# Patient Record
Sex: FEMALE | Race: WHITE
[De-identification: names, ages, dates, MRNs, and addresses within clinical notes are randomized per-mention and may not be internally consistent; named-entity substitution may affect disease eponyms.]

---

## 2019-10-08 ENCOUNTER — HOSPITAL ENCOUNTER (OUTPATIENT)
Dept: HOSPITAL 35 - PAIN | Age: 69
Discharge: HOME | End: 2019-10-08
Attending: ANESTHESIOLOGY
Payer: COMMERCIAL

## 2019-10-08 VITALS — DIASTOLIC BLOOD PRESSURE: 75 MMHG | SYSTOLIC BLOOD PRESSURE: 140 MMHG

## 2019-10-08 VITALS — HEIGHT: 67.01 IN | WEIGHT: 173.6 LBS | BODY MASS INDEX: 27.25 KG/M2

## 2019-10-08 DIAGNOSIS — M54.2: Primary | ICD-10-CM

## 2019-10-08 DIAGNOSIS — M79.18: ICD-10-CM

## 2019-10-08 DIAGNOSIS — Z98.890: ICD-10-CM

## 2019-10-08 DIAGNOSIS — Z79.899: ICD-10-CM

## 2019-10-08 DIAGNOSIS — Z79.891: ICD-10-CM

## 2019-10-08 DIAGNOSIS — G89.29: ICD-10-CM

## 2019-10-08 NOTE — NUR
Pain Clinic Assessment:
 
1. History of Osteoarthritis:
Not Applicable
   History of Rheumatoid Arthritis:
Not Applicable
 
2. Height: 5 ft. 7 in. 170.2 cm.
   Weight: 173.6 lb.  oz. 78.744 kg.
   Patient's BMI: 27.2
 
3. Vital Signs:
   BP: 140/75 Pulse: 79 Resp: 16
   Temp:  02 Sat: 100 ECG Mon:
 
4. Pain Intensity: 7
 
5. Fall Risk:
   Dizziness: N  Needs help standing or walking: N
   Fallen in the last 3 months: N
   Fall risk comments:
 
 
6. Patient on Blood Thinner: None
 
7. History of Hypertension: N
 
8. Opioid Therapy greater than 6 weeks: N
   Opiate Contract Signed:
 
9. Risk Assessment Tool Provided:
 
10. Functional Assessment Tool: 47/70
 
11. Recreational Drug Use: Never Drug Type:
    Tobacco Use: Never Smoker Tobacco Type:
       Amount or Packs/day:  How Many Years:
    Alcohol Use: No  Frequency:  Quant:

## 2019-10-15 NOTE — HPC
[facility]
[address] [place]   [address]                     PAIN MANAGEMENT CONSULTATION  
_______________________________________________________________________________
 
Name:       CESAR LEMUS             Room #:                     REG ENEIDA 
LILIANA.#:      [id number]                       Account #:      [id number]  
Admission:  10/08/19    Attend Phys:    Yunior Ortega DO  
Discharge:              Date of Birth:  [birth year]  
                                                          Report #: [id number]
                                                                    [id number]   
_______________________________________________________________________________
THIS REPORT FOR:   //name//                          
 
CC: Shahbaz Hernandez MD
 
DATE OF SERVICE:  10/08/2019
 
 
CHIEF COMPLAINT:  Right cervical pain.
 
HISTORY OF PRESENT ILLNESS:  As you know, the patient is a very pleasant
68-year-old female with recurrent right neck and right trapezius pain.  She
states pain began spontaneously without inciting injury or trauma.  She is
placing pain around 7/10.  The patient indicates pain is exacerbated with moving
her right shoulder or rotating her neck to the right, improves with chiropractic
manipulation for a transient period of time.  Heat and cold compresses,
over-the-counter medications and stretching exercises.  She denies injury or
trauma to the neck or right trapezius that may have led to symptoms.  She states
she spends an exorbitant amount of time in front of a computer and though she
has made some changes from an ergonomic standpoint she continues to experience
pain exacerbated with dozen activities, specifically.
 
ALLERGIES:  NO KNOWN DRUG ALLERGIES.
 
CURRENT MEDICATIONS:  Levothyroxine 88 mcg per day, hydrocodone 5/325 one tab
every 8 hours p.r.n. for pain.
 
SOCIAL HISTORY:  The patient denies tobacco, alcohol, IV or illicit drug use. 
She is working from home.  She is unaccompanied today.
 
IMAGING:  No new imaging available.
 
PQRS:  The patient has known mild arthritic changes of the cervical spine.  No
rheumatoid arthritis.  She is placing pain intensity is 7/10, not a fall risk,
has not had a fall in the last 3 months, not on any blood thinners, nor she is
treated for hypertension.  She is on chronic opioids, but has a low opioid
addiction potential.  Pain impact score 47/70, moderate to severe interference
of daily activities secondary to pain.
 
PHYSICAL EXAMINATION:
VITAL SIGNS:  Blood pressure 140/75, pulse 79, respiratory rate 16 and
unlabored.  The patient is 100% on room air.  Height 5 feet 7 inches tall,
weight 173.6 pounds, BMI calculated 27.2.
GENERAL:  Well-developed, well-nourished, well-hydrated 68-year-old female
appearing stated age.  She is in no acute distress, awake, alert and oriented x
 
 
 
[facility]
[address] [place] [address]                     PAIN MANAGEMENT CONSULTATION  
_______________________________________________________________________________
 
Name:       CESAR LEMUS             [facility] #[facility]                     [facility]      [id number]                       Account #:      [id number]  
Admission:  10/08/19    Attend Phys:    Yunior Ortega DO  
Discharge:              Date of Birth:  [birth year]  
                                                          Report #: [id number]
                                                                    [id number]   
_______________________________________________________________________________
3, pain is rated today 7/10.
HEENT:  Normocephalic, atraumatic.  Pupils equal, round, reactive to light.
EXTREMITIES:  Show no clubbing, no cyanosis, no edema.
MUSCULOSKELETAL:  The patient has palpatory tenderness over the paraspinal
musculature of the cervical spine on the right, negative left.  Deep palpation
in area causes intensification of pain at 9.  Specific trigger points that
radiated the patient's typical pain.  There are multiple tender points as well. 
Cervical provocation testing including extension, rotation, lateral flexion to
the left cause intensification of symptoms.  Rotation of the right slightly
improves overall pain.  Lateral flexion to the right improves pain considerably.
 Spurling's test negative.
 
ASSESSMENT:
1.  Myofascial pain.
2.  Chronic muscle spasms of the paraspinal musculature of cervical spine.
 
PLAN:  The patient has returned today in followup visit with recurrent
myofascial pain involving the paraspinal musculature of the right cervical
spine.  I was able to elicit 9 specific trigger points that caused the patient's
typical radiating pain pattern.  We discussed the trigger point injections again
today.  She noted excellent benefit with previous trigger points series.  She
has requested this to be done today.  She is not on any blood thinners, which
would preclude us from having the patient undergo the procedure.
 
1.  The patient was advised risks and benefits of trigger point injections. 
These risks include but are not necessarily limited to bleeding, bruising,
infection, worsening of pain, no relief of pain, also risk of temporary or
permanent muscle weakness, temporary or permanent nerve damage, possible
pneumothorax and death.  The patient states understood and wished to proceed.
2.  The patient was provided a prescription of tizanidine 4 mg dose 1 tab p.o.
t.i.d.  I have given the patient #90 tablets, no refills.  The patient was
advised to utilize medication only when pain is related to muscle spasming.  She
does not uses this for any other purpose.  She is not to drive or operate heavy
equipment on this medication as it can cause somnolence, decreased mental
acuity, disorientation and confusion.  She was given this prescription with no
refills.  Refills of medications can be provided.  The patient should need to
contact our clinic by phone.  We could provide up to 3 prescriptions until she
is needed to be seen again in return visit.
3.  We will see the patient back in followup visit on an as needed basis for
possible next in a series of trigger point injections.
 
PROCEDURE NOTE
 
DESCRIPTION OF PROCEDURE:  Trigger point injections of the right paracervical
musculature.
 
 
 
 
[facility] [address]   [address]                     PAIN MANAGEMENT CONSULTATION  
_______________________________________________________________________________
 
Name:       CESAR LEMUS             Room #:                     REG [facility] 
CHRISTIANO#:      [id number]                       Account #:      [id number]  
Admission:  10/08/19    Attend Phys:    Yunior Ortega DO  
Discharge:              Date of Birth:  [birth year]  
                                                          Report #: [id number]
                                                                    [id number]   
_______________________________________________________________________________
After obtaining written consent, the patient was placed in a seated position. 
By palpating using a single finger, 8 trigger points were identified that
reproduced the patient's typical radiating pain pattern.  Trigger points were
located in 2 different muscle groups on the right side.  Each of the target
sites of injections were cleansed using aseptic technique with chlorhexidine.
 
A 27-gauge 1-1/4 inch needle was then used to perform trigger point injections. 
The needle was advanced towards each trigger point until the patient's radiating
pain pattern was reproduced.  After negative aspiration for heme, 1 mL of a
solution containing 1 mL, 40 mg per mL, 40 mg total triamcinolone along with 8
mL of bupivacaine 0.5% injected in a fanned out distribution at each site. 
Total volume of 9 mL being provided.  Sterile bandage placed over each injection
sites.
 
The patient tolerated procedure well, carefully escorted to recovery room in
stable condition.  No apparent complications.  After meeting discharge criteria,
the patient discharged home.
 
 
 
 
 
 
 
 
 
 
 
 
 
 
 
 
 
 
 
 
 
 
 
 
 
 
 
  <ELECTRONICALLY SIGNED>
   By: Yunior Ortega DO          
  10/15/19     0801
D: 10/08/19 1750                           _____________________________________
T: 10/09/19 1340                           Yunior Ortega DO            /nt

## 2019-10-16 ENCOUNTER — HOSPITAL ENCOUNTER (OUTPATIENT)
Dept: HOSPITAL 35 - PAIN | Age: 69
Discharge: HOME | End: 2019-10-16
Attending: ANESTHESIOLOGY
Payer: COMMERCIAL

## 2019-10-16 VITALS — BODY MASS INDEX: 27.31 KG/M2 | HEIGHT: 67.01 IN | WEIGHT: 174 LBS

## 2019-10-16 VITALS — DIASTOLIC BLOOD PRESSURE: 75 MMHG | SYSTOLIC BLOOD PRESSURE: 144 MMHG

## 2019-10-16 DIAGNOSIS — Z79.899: ICD-10-CM

## 2019-10-16 DIAGNOSIS — G89.29: ICD-10-CM

## 2019-10-16 DIAGNOSIS — Z98.890: ICD-10-CM

## 2019-10-16 DIAGNOSIS — M79.18: Primary | ICD-10-CM

## 2019-10-16 DIAGNOSIS — Z79.891: ICD-10-CM

## 2019-10-16 NOTE — NUR
Pain Clinic Assessment:
 
1. History of Osteoarthritis:
Not Applicable
   History of Rheumatoid Arthritis:
Not Applicable
 
2. Height: 5 ft. 7 in. 170.2 cm.
   Weight: 174.0 lb.  oz. 78.926 kg.
   Patient's BMI: 27.2
 
3. Vital Signs:
   BP: 144/75 Pulse: 78 Resp: 16
   Temp:  02 Sat: 99 ECG Mon:
 
4. Pain Intensity: 8
 
5. Fall Risk:
   Dizziness: N  Needs help standing or walking: N
   Fallen in the last 3 months: N
   Fall risk comments:
 
 
6. Patient on Blood Thinner: None
 
7. History of Hypertension: N
 
8. Opioid Therapy greater than 6 weeks: N
   Opiate Contract Signed:
 
9. Risk Assessment Tool Provided:
 
10. Functional Assessment Tool: 47/70
 
11. Recreational Drug Use: Never Drug Type:
    Tobacco Use: Never Smoker Tobacco Type:
       Amount or Packs/day:  How Many Years:
    Alcohol Use: No  Frequency:  Quant:

## 2019-10-22 NOTE — HPC
[facility]
[place]   [address]                     PAIN MANAGEMENT CONSULTATION  
_______________________________________________________________________________
 
Name:       CESAR LEMUS             Room #:                     REG FEL[facility] FORD.#:      [id number]                       Account #:      [id number]  
Admission:  10/16/19    Attend Phys:    Yunior Ortega DO  
Discharge:              Date of Birth:  [birth year]  
                                                          Report #: [id number]
                                                                    [id number]   
_______________________________________________________________________________
THIS REPORT FOR:   //name//                          
 
CC: Shahbaz Gabriel MD
 
DATE OF SERVICE:  10/16/2019
 
 
CHIEF COMPLAINT:  Right paracervical muscle pain.
 
HISTORY OF PRESENT ILLNESS:  As you know, the patient is a very pleasant
68-year-old female with continued right trapezius pain and right rhomboid pain. 
The patient underwent trigger point injections with resolution of the majority
of her symptoms, but unfortunately she continues to experience point specific
areas at 7 different locations that continued to cause pain.  She has made
adjustments in her ergonomics at home, addressing her current work space both at
home and at work, which has improved symptoms, but she is left with the 7
trigger points.  She returns today to undergo next in the series of trigger
point injections in hopes of improving symptoms.  We reviewed at our last visit
and again today the MRI of the cervical spine, which shows essentially normal
findings in the cervical region.  This was dated 08/24/2016.  I do not perceive
any noticeable radicular component of the patient's symptoms today.  She returns
for trigger point injections.
 
ALLERGIES:  No known drug allergies.
 
CURRENT MEDICATIONS:  Levothyroxine 88 mcg per day, hydrocodone/acetaminophen
5/325 one tab every 8 hours p.r.n. for pain.
 
SOCIAL HISTORY:  The patient denies tobacco, alcohol, IV or illicit drug use. 
She is working, not receiving workmen's compensation, unaccompanied today.
 
IMAGING:  No new imaging available.
 
PQRS:  The patient has mild arthritic changes of the cervical spine and mild
changes in the thoracic spine, no rheumatoid arthritis.  She is placing current
pain score at 8/10, not a fall risk, has not had a fall in the last 3 months. 
She is not on blood thinners.  She is not treated for hypertension.  She is not
on chronic opioids.  Risk assessment tool is low for opioid addiction.  Pain
impact score 47/70, severe interference of daily activities secondary to pain.
 
PHYSICAL EXAMINATION:
VITAL SIGNS:  Blood pressure 144/75, pulse 78, respiratory rate 16 and
unlabored, the patient is 99% on room air.  Height 5 feet 7 inches tall, weight
 
 
 
[facility]
[address] [place]   [address]                     PAIN MANAGEMENT CONSULTATION  
_______________________________________________________________________________
 
Name:       CESAR LEMUS             Room #:                     REG [facility]#:      [id number]                       Account #:      [id number]  
Admission:  10/16/19    Attend Phys:    Yunior Ortega DO  
Discharge:              Date of Birth:  [birth year]  
                                                          Report #: [id number]
                                                                    [id number]   
_______________________________________________________________________________
174 pounds, BMI calculated 27.2.
GENERAL:  Well-developed, well-nourished, well-hydrated, 68-year-old female,
appearing stated age, pain is rated today at 8/10.
HEENT:  Normocephalic, atraumatic.  Pupils equal, round, reactive to light. 
Extraocular muscles are intact.
EXTREMITIES:  Show no clubbing, no cyanosis, no edema.
MUSCULOSKELETAL:  Palpatory tenderness is again noted over the paraspinal
musculature of the right cervical region.  Deep palpation of the area identify 7
different trigger points.  They are located within the trapezius, splenius
capitis and the rhomboids on the right.  Multiple tender points in the area.
 
ASSESSMENT:
1.  Myofascial pain.
2.  Chronic muscle spasms of the right upper back and cervical spine.
3.  Chronic intractable pain.
 
PLAN:
1.  The patient returns today in followup visit having noted improvement with
the trigger point injections provided at her last visit.  Unfortunately, she
remains with 7 different specific trigger points within the trapezius, the
splenius capitis muscle on the right as well as the rhomboids on the right.  She
returns to undergo next in the series of trigger point injections to address
these residual pain generators.  She is also requesting refill on her
hydrocodone as she has run out of medication as she has been using the
medication more frequently with this increase in pain.  She has been advised
risks and benefits of a trigger point injection, states she understood and
wished to proceed.
2.  The patient was provided a prescription of hydrocodone 5/325 one tab every 8
hours p.r.n. pain.  I have given the patient #60 tablets, no refills.  The
patient was advised to take the medication as directed, not to take the
medication prophylactically.
3.  We reviewed the fact that opiate medications are being used to provide
analgesia adequate to support activities of daily living, not attempting to
achieve a specific pain score on the 0-10 Visual Analog Scale.  The current
opiate medications are providing sufficient analgesia to allow the patient to
participate in activities of daily living.  The patient is not exhibiting any
aberrant behavior suggestive of drug diversion.  The patient is not having any
adverse reactions to medications.  The patient is not suffering from daytime
somnolence or mental acuity changes.  The patient is managing opiate-induced
constipation with appropriate over-the-counter agents and dietary
considerations.  The patient was counseled on concern for caution with operating
a motor vehicle while using opiate medications.
 
A physical exam was performed and the patient's functional status was evaluated.
 All patients with back pain were advised against the bed rest greater than 4
days and were advised to return to normal activities.  Pain score assessment was
 
 
 
[facility]
[address]
[place]   [address]                     PAIN MANAGEMENT CONSULTATION  
_______________________________________________________________________________
 
Name:       CESAR LEMUS             Room #:                     REG ENEIDA 
LILIANA.#:      [id number]                       Account #:      [id number]  
Admission:  10/16/19    Attend Phys:    Yunior Ortega DO  
Discharge:              Date of Birth:  [birth year]  
                                                          Report #: [id number]
                                                                    [id number]   
_______________________________________________________________________________
noted and the treatment plan was reviewed with the patient.  All current
medications, both prescribed and OTC were reviewed and reconciled on the
electronic medical record.  Tobacco screening was accomplished and smoking
cessation was advised when indicated.  BMI was noted and diet/exercise
modification was recommended for all patients following outside normal
parameters.
 
I reviewed with the patient today their responsibilities to safeguard
prescription medications, reviewed their responsibility to utilize medications
only as prescribed by the physician.  They are to seek and receive pain
medications only from 1 physician group ( [facility]).  They are to use 1
pharmacy and keep the clinic informed if they change pharmacies.  Their
responsibilities include making followup visits in a timely fashion and to avoid
abrupt discontinuation of medication usage.  Their responsibilities further
include bringing their medications (bottles from the pharmacy with residual
pills) to the visit for possible confirmation of pill counts and the patient
understands it is their responsibility to submit to random drug screens to
ensure both that the medications prescribed are present, and that no other
controlled substances are present.  All prescriptions provided today were
generated electronically.
4.  The patient will return to our clinic on an as needed basis for possible
next in the series of trigger point injections.
 
PROCEDURE NOTE
 
DESCRIPTION OF PROCEDURE:  Trigger point injections of the right paracervical,
trapezius and rhomboids.
 
After obtaining written consent, the patient was placed in a seated position. 
By palpating using a single finger, 7 trigger points were identified that
reproduced the patient's typical radiating pain pattern.  Trigger points were
located in 3 different muscle groups, the trapezius, the rhomboids and the
splenius capitis muscle on the right.  Each of the target sites of injections
were cleansed using aseptic techniques with chlorhexidine.
 
A 27-gauge 1-1/4 inch needle was used to perform trigger point injections.  The
needle was advanced towards each trigger point until the patient's typical
radiating pain pattern was reproduced.  After negative aspiration for heme, 1 mL
of a solution containing 1 mL, 40 mg per mL, 40 mg total triamcinolone along
with 6 mL of bupivacaine 0.5% injected in a fanned out distribution at each
site.  Total volume provided 7 mL.  Sterile bandages were placed over each
injection sites.
 
The patient tolerated the procedure well, carefully escorted to recovery room in
 
 
 
[place] [address]                     PAIN MANAGEMENT CONSULTATION  
_______________________________________________________________________________
 
Name:       CESAR LEMUS             [facility]      [id number]                       Account #:      [id number]  
Admission:  10/16/19    Attend Phys:    Yunior Ortega DO  
Discharge:              Date of Birth:  [birth year]  
                                                          Report #: [id number]
                                                                    [id number]   
_______________________________________________________________________________
stable condition.  No apparent complications.  After meeting our discharge
criteria, the patient discharged home.
 
 
 
 
 
 
 
 
 
 
 
 
 
 
 
 
 
 
 
 
 
 
 
 
 
 
 
 
 
 
 
 
 
 
 
 
 
 
 
 
 
 
  <ELECTRONICALLY SIGNED>
   By: Yunior Ortega DO          
  10/22/19     0801
D: 10/16/19 0950                           _____________________________________
T: 10/17/19 0127                           Yunior Ortega DO            /nt